# Patient Record
Sex: FEMALE | Race: WHITE
[De-identification: names, ages, dates, MRNs, and addresses within clinical notes are randomized per-mention and may not be internally consistent; named-entity substitution may affect disease eponyms.]

---

## 2017-03-22 ENCOUNTER — RECORD ABSTRACTING (OUTPATIENT)
Age: 47
End: 2017-03-22

## 2017-03-22 DIAGNOSIS — Z78.9 OTHER SPECIFIED HEALTH STATUS: ICD-10-CM

## 2017-03-22 DIAGNOSIS — Z86.11 PERSONAL HISTORY OF TUBERCULOSIS: ICD-10-CM

## 2017-03-22 DIAGNOSIS — Z12.4 ENCOUNTER FOR SCREENING FOR MALIGNANT NEOPLASM OF CERVIX: ICD-10-CM

## 2017-03-22 PROBLEM — Z00.00 ENCOUNTER FOR PREVENTIVE HEALTH EXAMINATION: Status: ACTIVE | Noted: 2017-03-22

## 2017-06-08 ENCOUNTER — APPOINTMENT (OUTPATIENT)
Dept: OBGYN | Facility: CLINIC | Age: 47
End: 2017-06-08

## 2017-06-08 VITALS
DIASTOLIC BLOOD PRESSURE: 62 MMHG | SYSTOLIC BLOOD PRESSURE: 100 MMHG | WEIGHT: 119 LBS | BODY MASS INDEX: 20.07 KG/M2 | HEIGHT: 64.5 IN

## 2017-06-08 DIAGNOSIS — Z01.419 ENCOUNTER FOR GYNECOLOGICAL EXAMINATION (GENERAL) (ROUTINE) W/OUT ABNORMAL FINDINGS: ICD-10-CM

## 2017-06-13 ENCOUNTER — RESULT REVIEW (OUTPATIENT)
Age: 47
End: 2017-06-13

## 2017-06-15 ENCOUNTER — OUTPATIENT (OUTPATIENT)
Dept: OUTPATIENT SERVICES | Facility: HOSPITAL | Age: 47
LOS: 1 days | Discharge: HOME | End: 2017-06-15

## 2017-06-19 LAB — HPV E6+E7 MRNA CVX QL NAA+PROBE: NOT DETECTED

## 2017-06-28 DIAGNOSIS — R92.8 OTHER ABNORMAL AND INCONCLUSIVE FINDINGS ON DIAGNOSTIC IMAGING OF BREAST: ICD-10-CM

## 2018-06-24 ENCOUNTER — FORM ENCOUNTER (OUTPATIENT)
Age: 48
End: 2018-06-24

## 2018-06-25 ENCOUNTER — OUTPATIENT (OUTPATIENT)
Dept: OUTPATIENT SERVICES | Facility: HOSPITAL | Age: 48
LOS: 1 days | Discharge: HOME | End: 2018-06-25

## 2018-06-25 DIAGNOSIS — Z12.31 ENCOUNTER FOR SCREENING MAMMOGRAM FOR MALIGNANT NEOPLASM OF BREAST: ICD-10-CM

## 2023-07-27 ENCOUNTER — EMERGENCY (EMERGENCY)
Facility: HOSPITAL | Age: 53
LOS: 0 days | Discharge: HOME | End: 2023-07-27
Attending: EMERGENCY MEDICINE
Payer: MEDICAID

## 2023-07-27 VITALS
OXYGEN SATURATION: 100 % | HEART RATE: 99 BPM | SYSTOLIC BLOOD PRESSURE: 173 MMHG | WEIGHT: 119.93 LBS | TEMPERATURE: 98 F | DIASTOLIC BLOOD PRESSURE: 88 MMHG | RESPIRATION RATE: 18 BRPM | HEIGHT: 60 IN

## 2023-07-27 DIAGNOSIS — Z86.018 PERSONAL HISTORY OF OTHER BENIGN NEOPLASM: ICD-10-CM

## 2023-07-27 DIAGNOSIS — N63.20 UNSPECIFIED LUMP IN THE LEFT BREAST, UNSPECIFIED QUADRANT: ICD-10-CM

## 2023-07-27 PROCEDURE — 99282 EMERGENCY DEPT VISIT SF MDM: CPT

## 2023-07-27 PROCEDURE — 99283 EMERGENCY DEPT VISIT LOW MDM: CPT

## 2023-07-27 NOTE — ED ADULT NURSE NOTE - NSFALLUNIVINTERV_ED_ALL_ED
Bed/Stretcher in lowest position, wheels locked, appropriate side rails in place/Call bell, personal items and telephone in reach/Instruct patient to call for assistance before getting out of bed/chair/stretcher/Non-slip footwear applied when patient is off stretcher/[place] to call system/Physically safe environment - no spills, clutter or unnecessary equipment/Purposeful proactive rounding/Room/bathroom lighting operational, light cord in reach

## 2023-07-27 NOTE — ED PROVIDER NOTE - ATTENDING CONTRIBUTION TO CARE
53 YF, PW left breast lump, nontender, nonmobile, left upper quadrant location.  No nipple discharge or blood, no noted skin change on exam, no weight change or night sweats.  No other chest pain.  -Likely breast mass associated with changes in hormones given recent delivery.    -Also possible is mastitis, though less likely without current breast-feeding and 9 months ago delivery.  -Malignancy also possible, unable to further evaluate at this time, with no secondary supportive signs.  Will require further breast imaging with breast specialty.

## 2023-07-27 NOTE — ED ADULT TRIAGE NOTE - CHIEF COMPLAINT QUOTE
pt c/o lump found in left breast 2 days ago. lump noticed to be a little bigger, similar situation 15 years ago on right side. denies pain

## 2023-07-27 NOTE — ED PROVIDER NOTE - PATIENT PORTAL LINK FT
You can access the FollowMyHealth Patient Portal offered by [facility] by registering at the following website: [email or web address]. By joining [employer]’s FollowMyHealth portal, you will also be able to view your health information using other applications (apps) compatible with our system.

## 2023-07-27 NOTE — ED PROVIDER NOTE - CLINICAL SUMMARY MEDICAL DECISION MAKING FREE TEXT BOX
53 YF, PW left breast lump, nontender, nonmobile, left upper quadrant location.  No nipple discharge or blood, no noted skin change on exam, no weight change or night sweats.  Recommended close follow-up with OB/GYN/breast specialist.

## 2023-07-27 NOTE — ED PROVIDER NOTE - NSFOLLOWUPCLINICS_GEN_ALL_ED_FT
[facility]  Breast  [address]  [place] [address]  Phone: [phone]  Fax:

## 2023-07-27 NOTE — ED PROVIDER NOTE - OBJECTIVE STATEMENT
53-year-old female G1, P1 delivered 9 months ago not breast-feeding history of fibromyalgia of the right breast resected and biopsied benign follows with Dr. Ribeiro complains of left breast mass that she noticed yesterday.  Patient describes mass is nontender nonmobile and in the left upper quadrant of the left breast.  No constitutional symptoms, no unintentional weight loss no other complaints.

## 2023-07-27 NOTE — ED PROVIDER NOTE - PHYSICAL EXAMINATION
CONSTITUTIONAL: nontoxic appearing, in no acute distress  HEAD:  normocephalic, atraumatic  EYES:  no conjunctival injection, no eye discharge, tracking well  ENT: mmm  NECK:  supple, no masses, no tender anterior/posterior cervical lymphadenopathy  CV:  regular rate and rhythm, cap refill < 2 seconds  CHEST: L breast w/ multiple nontender nonmobile lumps at the 2oclock position; multiple lumps at the 11oclock position of the R breast, nonmobile, nontender; no erythema or edema, or excoriations   RESP:  normal respiratory effort,  ABD:  soft, nontender, nondistended, no masses, no organomegaly  LYMPH:  no significant lymphadenopathy  MSK/NEURO:  normal movement, normal tone  SKIN:  warm, dry, no rash

## 2023-07-27 NOTE — ED ADULT NURSE NOTE - OBJECTIVE STATEMENT
Use over-the-counter Xyzal 5 mg by mouth once daily and Flonase 1 spray in each nostril daily as needed for relief of allergic symptoms such as runny nose, nasal congestion, and cough.        Ciprofloxacin tablets  What is this medicine?  CIPROFLOXACIN (sip kwesi FLOX a sin) is a quinolone antibiotic. It is used to treat certain kinds of bacterial infections. It will not work for colds, flu, or other viral infections.  How should I use this medicine?  Take this medicine by mouth with a glass of water. Follow the directions on the prescription label. Take your medicine at regular intervals. Do not take your medicine more often than directed. Take all of your medicine as directed even if you think your are better. Do not skip doses or stop your medicine early.  You can take this medicine with food or on an empty stomach. It can be taken with a meal that contains dairy or calcium, but do not take it alone with a dairy product, like milk or yogurt or calcium-fortified juice.  A special MedGuide will be given to you by the pharmacist with each prescription and refill. Be sure to read this information carefully each time.  Talk to your pediatrician regarding the use of this medicine in children. Special care may be needed.  What side effects may I notice from receiving this medicine?  Side effects that you should report to your doctor or health care professional as soon as possible:  · allergic reactions like skin rash or hives, swelling of the face, lips, or tongue  · anxious  · confusion  · depressed mood  · diarrhea  · fast, irregular heartbeat  · hallucination, loss of contact with reality  · joint, muscle, or tendon pain or swelling  · pain, tingling, numbness in the hands or feet  · suicidal thoughts or other mood changes  · sunburn  · unusually weak or tired  Side effects that usually do not require medical attention (report to your doctor or health care professional if they continue or are bothersome):  · dry  mouth  · headache  · nausea  · trouble sleeping  What may interact with this medicine?  Do not take this medicine with any of the following medications:  · cisapride  · droperidol  · terfenadine  · tizanidine  This medicine may also interact with the following medications:  · antacids  · birth control pills  · caffeine  · cyclosporin  · didanosine (ddI) buffered tablets or powder  · medicines for diabetes  · medicines for inflammation like ibuprofen, naproxen  · methotrexate  · multivitamins  · omeprazole  · phenytoin  · probenecid  · sucralfate  · theophylline  · warfarin  What if I miss a dose?  If you miss a dose, take it as soon as you can. If it is almost time for your next dose, take only that dose. Do not take double or extra doses.  Where should I keep my medicine?  Keep out of the reach of children.  Store at room temperature below 30 degrees C (86 degrees F). Keep container tightly closed. Throw away any unused medicine after the expiration date.  What should I tell my health care provider before I take this medicine?  They need to know if you have any of these conditions:  · bone problems  · cerebral disease  · history of low levels of potassium in the blood  · joint problems  · irregular heartbeat  · kidney disease  · myasthenia gravis  · seizures  · tendon problems  · tingling of the fingers or toes, or other nerve disorder  · an unusual or allergic reaction to ciprofloxacin, other antibiotics or medicines, foods, dyes, or preservatives  · pregnant or trying to get pregnant  · breast-feeding  What should I watch for while using this medicine?  Tell your doctor or health care professional if your symptoms do not improve.  Do not treat diarrhea with over the counter products. Contact your doctor if you have diarrhea that lasts more than 2 days or if it is severe and watery.  You may get drowsy or dizzy. Do not drive, use machinery, or do anything that needs mental alertness until you know how this  medicine affects you. Do not stand or sit up quickly, especially if you are an older patient. This reduces the risk of dizzy or fainting spells.  This medicine can make you more sensitive to the sun. Keep out of the sun. If you cannot avoid being in the sun, wear protective clothing and use sunscreen. Do not use sun lamps or tanning beds/booths.  Avoid antacids, aluminum, calcium, iron, magnesium, and zinc products for 6 hours before and 2 hours after taking a dose of this medicine.  NOTE:This sheet is a summary. It may not cover all possible information. If you have questions about this medicine, talk to your doctor, pharmacist, or health care provider. Copyright© 2017 [employer]         pt c/o lump found in left breast 2 days ago. lump noticed to be a little bigger, similar situation 15 years ago on right side. denies pain. no signs of distress noted.

## 2023-07-28 NOTE — CHART NOTE - NSCHARTNOTEFT_GEN_A_CORE
breast surgery appt scheduled for 8/24 at 1 pm with Dr. Kimberli Cano [facility] pt aware of appt details.

## 2023-08-21 NOTE — HISTORY OF PRESENT ILLNESS
[FreeTextEntry1] : Patient is a 53F who recently presented to the ED with concern of a possible left breast palpable mass.  States she has a history of right breast excision at 36 that was benign.  FHx:  He rmost recent imaging is as follows: 6/25/2018: B scg mmg --> BIRADS 2 The breasts are extremely dense, which lowers the sensitivity of mammography.  There is an area of benign architectural distortion corresponding to the site of surgery and consistent with post operative fibrosis seen in the right breast. No suspicious masses, calcifications or other abnormalities are seen. When compared to prior studies there is no significant change.

## 2023-08-22 DIAGNOSIS — N63.0 UNSPECIFIED LUMP IN UNSPECIFIED BREAST: ICD-10-CM

## 2023-08-29 ENCOUNTER — RESULT REVIEW (OUTPATIENT)
Age: 53
End: 2023-08-29

## 2023-08-29 ENCOUNTER — OUTPATIENT (OUTPATIENT)
Dept: OUTPATIENT SERVICES | Facility: HOSPITAL | Age: 53
LOS: 1 days | End: 2023-08-29
Payer: COMMERCIAL

## 2023-08-29 DIAGNOSIS — R92.2 INCONCLUSIVE MAMMOGRAM: ICD-10-CM

## 2023-08-29 DIAGNOSIS — N63.20 UNSPECIFIED LUMP IN THE LEFT BREAST, UNSPECIFIED QUADRANT: ICD-10-CM

## 2023-08-29 DIAGNOSIS — Z12.31 ENCOUNTER FOR SCREENING MAMMOGRAM FOR MALIGNANT NEOPLASM OF BREAST: ICD-10-CM

## 2023-08-29 PROCEDURE — G0279: CPT

## 2023-08-29 PROCEDURE — 76641 ULTRASOUND BREAST COMPLETE: CPT | Mod: 26,50

## 2023-08-29 PROCEDURE — 76641 ULTRASOUND BREAST COMPLETE: CPT | Mod: 50

## 2023-08-29 PROCEDURE — 77066 DX MAMMO INCL CAD BI: CPT

## 2023-08-29 PROCEDURE — G0279: CPT | Mod: 26

## 2023-08-29 PROCEDURE — 77066 DX MAMMO INCL CAD BI: CPT | Mod: 26

## 2023-08-30 ENCOUNTER — NON-APPOINTMENT (OUTPATIENT)
Age: 53
End: 2023-08-30

## 2023-08-30 DIAGNOSIS — N63.20 UNSPECIFIED LUMP IN THE LEFT BREAST, UNSPECIFIED QUADRANT: ICD-10-CM

## 2023-09-05 ENCOUNTER — APPOINTMENT (OUTPATIENT)
Age: 53
End: 2023-09-05
Payer: MEDICAID

## 2023-09-05 ENCOUNTER — RESULT REVIEW (OUTPATIENT)
Age: 53
End: 2023-09-05

## 2023-09-05 ENCOUNTER — OUTPATIENT (OUTPATIENT)
Dept: OUTPATIENT SERVICES | Facility: HOSPITAL | Age: 53
LOS: 1 days | End: 2023-09-05
Payer: MEDICAID

## 2023-09-05 DIAGNOSIS — R92.8 OTHER ABNORMAL AND INCONCLUSIVE FINDINGS ON DIAGNOSTIC IMAGING OF BREAST: ICD-10-CM

## 2023-09-05 PROCEDURE — A4648: CPT

## 2023-09-05 PROCEDURE — 88305 TISSUE EXAM BY PATHOLOGIST: CPT | Mod: 26

## 2023-09-05 PROCEDURE — 19083 BX BREAST 1ST LESION US IMAG: CPT | Mod: LT

## 2023-09-05 PROCEDURE — 77065 DX MAMMO INCL CAD UNI: CPT | Mod: 26,LT

## 2023-09-05 PROCEDURE — 77065 DX MAMMO INCL CAD UNI: CPT | Mod: LT

## 2023-09-05 PROCEDURE — 88305 TISSUE EXAM BY PATHOLOGIST: CPT

## 2023-09-06 LAB — SURGICAL PATHOLOGY STUDY: SIGNIFICANT CHANGE UP

## 2023-09-07 DIAGNOSIS — N64.89 OTHER SPECIFIED DISORDERS OF BREAST: ICD-10-CM

## 2023-09-07 DIAGNOSIS — D24.2 BENIGN NEOPLASM OF LEFT BREAST: ICD-10-CM

## 2023-09-07 DIAGNOSIS — N63.20 UNSPECIFIED LUMP IN THE LEFT BREAST, UNSPECIFIED QUADRANT: ICD-10-CM

## 2023-09-07 NOTE — HISTORY OF PRESENT ILLNESS
[FreeTextEntry1] : Patient is a 53F with new dx of left breast FA and BIRADS3 abnormality on left US    States she has a history of right breast excision at 36 that was benign.  FHx:  He rmost recent imaging is as follows: 6/25/2018: B scg mmg --> BIRADS 2 The breasts are extremely dense, which lowers the sensitivity of mammography.  There is an area of benign architectural distortion corresponding to the site of surgery and consistent with post operative fibrosis seen in the right breast. No suspicious masses, calcifications or other abnormalities are seen. When compared to prior studies there is no significant change.  08/29/2023 b/l dx mammo and US-->BIRADS4 -breasts are extremely dense -An oval partially obscured mass is noted in the left upper outer quadrant corresponds to the region of palpable concern.  -Postoperative changes are noted in the right lower inner quadrant.  LEFT US: - in the region of palpable concern, @1-2 N 4 to 5 a hypoechoic solid vascular mass measuring 2.3 x 1.2 x 2.1 cm. This is suspicious.   RIGHT US: -@1:00 4 cm from the nipple, there is an ovoid well-circumscribed hypoechoic mass measuring 0.8 x 0.7 x 0.4 cm--> This is probably benign.   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

## 2023-09-07 NOTE — DATA REVIEWED
[FreeTextEntry1] : CC: [id number]     EXAM:  MG US BREAST COMPLETE BI   ORDERED BY: DILLON REEVES  ACC: [id number]     EXAM:  MG MAMMO DIAG W CHINYERE BI#   ORDERED BY: DILLON REEVES 08/29/2023 INTERPRETATION:  Patient presents with a palpable lump in the left breast, upper outer quadrant. Patient has a history of fibroadenoma resection in the right breast in 2008  The patient reports her last clinical breast examination was performed within the past year  Spot magnification imaging of the symptomatic area was performed in addition to routine mammographic projections.  3D tomosynthesis images were obtained as well.  Computer-aided detection was utilized in the interpretation of this examination.  Comparison is made to several prior examinations dating back to 2013.  Breast composition:The breasts are extremely dense, which lowers the sensitivity of mammography.  An oval partially obscured mass is noted in the left upper outer quadrant corresponds to the region of palpable concern. Postoperative changes are noted in the right lower inner quadrant. Otherwise, no suspicious mass or architectural distortion is identified bilaterally.  Bilateral whole Breast Sonogram:  In the left breast, in the region of palpable concern, at 1-2 o'clock 4 to 5 cm from the nipple, there is a hypoechoic solid vascular mass measuring 2.3 x 1.2 x 2.1 cm. This is suspicious. In the right breast at 1:00 4 cm from the nipple, there is an ovoid well-circumscribed hypoechoic mass measuring 0.8 x 0.7 x 0.4 cm. This is probably benign. No other masses or cysts are identified. No evidence of axillary lymphadenopathy. IMPRESSION: 2.3 cm mass in the left breast is suspicious. Ultrasound-guided biopsy is recommended  0.8 cm mass in the right breast is probably benign and follow-up ultrasound is recommended.  Recommendation: Ultrasound-guided biopsy of the left breast.  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  Benign concordant histology.  Recommendation: Surgical management. Please note that the patient has a probably benign lesion in the contralateral breast and follow-up ultrasound in 6 months is recommended.  The results were discussed with the patient by telephone on 9/6/23 at 1230 pm  --- End of Report ---  *** END OF ADDENDUM # 1 ***     09/05/2023 INTERPRETATION:  CLINICAL HISTORY: Left breast mass  PROCEDURES: Left breast ultrasound guided spring loaded core biopsy and post clip placement two view left mammogram.  TECHNIQUE: Previous films and reports were reviewed. The procedure, its risks, benefits, and alternatives were explained to the patient, who expressed understanding and gave informed written and verbal consent. A time-out, which included the patient's full name, date of birth, description of the expected procedure and procedure site, was performed immediately before the procedure.  Using the usual sterile technique and ultrasound guidance, the previously described 2.3 cm mass in the left breast at 1-2 o'clock 4 to 5 cm from the nipple was biopsied utilizing a 14-gauge automated Bard core biopsy device with a 13-gauge introducer sheath. 5 samples were collected.  A marking clip ([employer] top-hat) was deployed under ultrasound guidance. Hemostasis was obtained and a sterile dressing was applied.  A left mammogram was performed: VIEWS: CC and ML views of the left breast. BREAST COMPOSITION: The breasts are extremely dense, which lowers the sensitivity of mammography. FINDINGS: The biopsy clip placed during the ultrasound guided biopsy is in the anticipated location in the left breast. FINAL ASSESSMENT Category: Post Procedure Mammogram for Marker Placement  The patient tolerated the procedure well and left the department in good condition with written discharge instructions.   IMPRESSION:  Successful ultrasound guided core biopsy of the left breast.  Pathology: Pending, to be reported as an addendum.

## 2023-09-11 ENCOUNTER — APPOINTMENT (OUTPATIENT)
Dept: BREAST CENTER | Facility: CLINIC | Age: 53
End: 2023-09-11
Payer: MEDICAID

## 2023-09-11 VITALS
DIASTOLIC BLOOD PRESSURE: 61 MMHG | HEIGHT: 64 IN | BODY MASS INDEX: 20.49 KG/M2 | SYSTOLIC BLOOD PRESSURE: 103 MMHG | WEIGHT: 120 LBS

## 2023-09-11 PROCEDURE — 99204 OFFICE O/P NEW MOD 45 MIN: CPT

## 2023-09-14 ENCOUNTER — APPOINTMENT (OUTPATIENT)
Dept: BREAST CENTER | Facility: CLINIC | Age: 53
End: 2023-09-14

## 2023-09-18 ENCOUNTER — APPOINTMENT (OUTPATIENT)
Dept: OBGYN | Facility: CLINIC | Age: 53
End: 2023-09-18

## 2024-01-22 ENCOUNTER — APPOINTMENT (OUTPATIENT)
Dept: OBGYN | Facility: CLINIC | Age: 54
End: 2024-01-22

## 2024-01-24 ENCOUNTER — NON-APPOINTMENT (OUTPATIENT)
Age: 54
End: 2024-01-24

## 2024-01-30 ENCOUNTER — OUTPATIENT (OUTPATIENT)
Dept: OUTPATIENT SERVICES | Facility: HOSPITAL | Age: 54
LOS: 1 days | End: 2024-01-30
Payer: MEDICAID

## 2024-01-30 VITALS
OXYGEN SATURATION: 100 % | DIASTOLIC BLOOD PRESSURE: 64 MMHG | TEMPERATURE: 98 F | HEIGHT: 64 IN | RESPIRATION RATE: 18 BRPM | HEART RATE: 78 BPM | SYSTOLIC BLOOD PRESSURE: 100 MMHG | WEIGHT: 115.08 LBS

## 2024-01-30 DIAGNOSIS — Z01.818 ENCOUNTER FOR OTHER PREPROCEDURAL EXAMINATION: ICD-10-CM

## 2024-01-30 DIAGNOSIS — Z98.890 OTHER SPECIFIED POSTPROCEDURAL STATES: Chronic | ICD-10-CM

## 2024-01-30 DIAGNOSIS — D24.2 BENIGN NEOPLASM OF LEFT BREAST: ICD-10-CM

## 2024-01-30 LAB
ALBUMIN SERPL ELPH-MCNC: 4.7 G/DL — SIGNIFICANT CHANGE UP (ref 3.5–5.2)
ALP SERPL-CCNC: 58 U/L — SIGNIFICANT CHANGE UP (ref 30–115)
ALT FLD-CCNC: 16 U/L — SIGNIFICANT CHANGE UP (ref 0–41)
ANION GAP SERPL CALC-SCNC: 13 MMOL/L — SIGNIFICANT CHANGE UP (ref 7–14)
AST SERPL-CCNC: 21 U/L — SIGNIFICANT CHANGE UP (ref 0–41)
BASOPHILS # BLD AUTO: 0.03 K/UL — SIGNIFICANT CHANGE UP (ref 0–0.2)
BASOPHILS NFR BLD AUTO: 0.5 % — SIGNIFICANT CHANGE UP (ref 0–1)
BILIRUB SERPL-MCNC: 0.2 MG/DL — SIGNIFICANT CHANGE UP (ref 0.2–1.2)
BUN SERPL-MCNC: 17 MG/DL — SIGNIFICANT CHANGE UP (ref 10–20)
CALCIUM SERPL-MCNC: 9.8 MG/DL — SIGNIFICANT CHANGE UP (ref 8.4–10.5)
CHLORIDE SERPL-SCNC: 103 MMOL/L — SIGNIFICANT CHANGE UP (ref 98–110)
CO2 SERPL-SCNC: 27 MMOL/L — SIGNIFICANT CHANGE UP (ref 17–32)
CREAT SERPL-MCNC: 0.6 MG/DL — LOW (ref 0.7–1.5)
EGFR: 107 ML/MIN/1.73M2 — SIGNIFICANT CHANGE UP
EOSINOPHIL # BLD AUTO: 0.31 K/UL — SIGNIFICANT CHANGE UP (ref 0–0.7)
EOSINOPHIL NFR BLD AUTO: 4.9 % — SIGNIFICANT CHANGE UP (ref 0–8)
GLUCOSE SERPL-MCNC: 81 MG/DL — SIGNIFICANT CHANGE UP (ref 70–99)
HCT VFR BLD CALC: 41.7 % — SIGNIFICANT CHANGE UP (ref 37–47)
HGB BLD-MCNC: 13.4 G/DL — SIGNIFICANT CHANGE UP (ref 12–16)
IMM GRANULOCYTES NFR BLD AUTO: 0.3 % — SIGNIFICANT CHANGE UP (ref 0.1–0.3)
LYMPHOCYTES # BLD AUTO: 1.92 K/UL — SIGNIFICANT CHANGE UP (ref 1.2–3.4)
LYMPHOCYTES # BLD AUTO: 30.5 % — SIGNIFICANT CHANGE UP (ref 20.5–51.1)
MCHC RBC-ENTMCNC: 30.5 PG — SIGNIFICANT CHANGE UP (ref 27–31)
MCHC RBC-ENTMCNC: 32.1 G/DL — SIGNIFICANT CHANGE UP (ref 32–37)
MCV RBC AUTO: 95 FL — SIGNIFICANT CHANGE UP (ref 81–99)
MONOCYTES # BLD AUTO: 0.49 K/UL — SIGNIFICANT CHANGE UP (ref 0.1–0.6)
MONOCYTES NFR BLD AUTO: 7.8 % — SIGNIFICANT CHANGE UP (ref 1.7–9.3)
NEUTROPHILS # BLD AUTO: 3.52 K/UL — SIGNIFICANT CHANGE UP (ref 1.4–6.5)
NEUTROPHILS NFR BLD AUTO: 56 % — SIGNIFICANT CHANGE UP (ref 42.2–75.2)
NRBC # BLD: 0 /100 WBCS — SIGNIFICANT CHANGE UP (ref 0–0)
PLATELET # BLD AUTO: 297 K/UL — SIGNIFICANT CHANGE UP (ref 130–400)
PMV BLD: 9.8 FL — SIGNIFICANT CHANGE UP (ref 7.4–10.4)
POTASSIUM SERPL-MCNC: 4.8 MMOL/L — SIGNIFICANT CHANGE UP (ref 3.5–5)
POTASSIUM SERPL-SCNC: 4.8 MMOL/L — SIGNIFICANT CHANGE UP (ref 3.5–5)
PROT SERPL-MCNC: 7.1 G/DL — SIGNIFICANT CHANGE UP (ref 6–8)
RBC # BLD: 4.39 M/UL — SIGNIFICANT CHANGE UP (ref 4.2–5.4)
RBC # FLD: 12.9 % — SIGNIFICANT CHANGE UP (ref 11.5–14.5)
SODIUM SERPL-SCNC: 143 MMOL/L — SIGNIFICANT CHANGE UP (ref 135–146)
WBC # BLD: 6.29 K/UL — SIGNIFICANT CHANGE UP (ref 4.8–10.8)
WBC # FLD AUTO: 6.29 K/UL — SIGNIFICANT CHANGE UP (ref 4.8–10.8)

## 2024-01-30 PROCEDURE — 36415 COLL VENOUS BLD VENIPUNCTURE: CPT

## 2024-01-30 PROCEDURE — 99214 OFFICE O/P EST MOD 30 MIN: CPT | Mod: 25

## 2024-01-30 PROCEDURE — 85025 COMPLETE CBC W/AUTO DIFF WBC: CPT

## 2024-01-30 PROCEDURE — 80053 COMPREHEN METABOLIC PANEL: CPT

## 2024-01-30 NOTE — H&P PST ADULT - HISTORY OF PRESENT ILLNESS
Patient is a 77 year old female presenting to PAST in preparation for Left Breast lumpectomy with Needle Localization  on  2/7  under lsb anesthesia by Dr. Ag  PATIENT CURRENTLY DENIES CHEST PAIN  SHORTNESS OF BREATH  PALPITATIONS,  DYSURIA, OR UPPER RESPIRATORY INFECTION IN PAST 2 WEEKS    Anesthesia Alert  NO--Difficult Airway  NO--History of neck surgery or radiation  NO--Limited ROM of neck  NO--History of Malignant hyperthermia  NO--Personal or family history of Pseudocholinesterase deficiency  NO--Prior Anesthesia Complication  NO--Latex Allergy  NO--Loose teeth  NO--History of Rheumatoid Arthritis  NO--LAVINIA  NO-- BLEEDING RISK  NO--Other_____    As per patient, this is their complete medical and surgical history, including medications both prescribed or over the counter.  Patient verbalized understanding of instructions and was given the opportunity to ask questions and have them answered.   Patient is a 77 year old female presenting to PAST in preparation for Left Breast lumpectomy with Needle Localization  on  2/7  under lsb anesthesia by Dr. Ag  Reports h/o abnormal mammo had a bx also and was advised to have above  PATIENT CURRENTLY DENIES CHEST PAIN  SHORTNESS OF BREATH  PALPITATIONS,  DYSURIA, OR UPPER RESPIRATORY INFECTION IN PAST 2 WEEKS    Anesthesia Alert  NO--Difficult Airway  NO--History of neck surgery or radiation  NO--Limited ROM of neck  NO--History of Malignant hyperthermia  NO--Personal or family history of Pseudocholinesterase deficiency  NO--Prior Anesthesia Complication  NO--Latex Allergy  NO--Loose teeth  NO--History of Rheumatoid Arthritis  NO--LAVINIA  NO-- BLEEDING RISK  NO--Other_____    Duke Activity Status Index (DASI) from [employer]  on 1/30/2024      RESULT SUMMARY:  58.2 points  The higher the score (maximum 58.2), the higher the functional status.    9.89 METs        INPUTS:  Take care of self —> 2.75 = Yes  Walk indoors —> 1.75 = Yes  Walk 1&ndash;2 blocks on level ground —> 2.75 = Yes  Climb a flight of stairs or walk up a hill —> 5.5 = Yes  Run a short distance —> 8 = Yes  Do light work around the house —> 2.7 = Yes  Do moderate work around the house —> 3.5 = Yes  Do heavy work around the house —> 8 = Yes  Do yardwork —> 4.5 = Yes  Have sexual relations —> 5.25 = Yes  Participate in moderate recreational activities —> 6 = Yes  Participate in strenuous sports —> 7.5 = Yes      Revised Cardiac Risk Index for Pre-Operative Risk from [employer]  on 1/30/2024      RESULT SUMMARY:  0 points  Class I Risk    3.9 %  30-day risk of death, MI, or cardiac arrest    From [employer] 2017. These numbers are higher than those from the original study (Van 1999). See Evidence for details.      INPUTS:  Elevated-risk surgery —> 0 = No  History of ischemic heart disease —> 0 = No  History of congestive heart failure —> 0 = No  History of cerebrovascular disease —> 0 = No  Pre-operative treatment with insulin —> 0 = No  Pre-operative creatinine >2 mg/dL / 176.8 µmol/L —> 0 = No        As per patient, this is their complete medical and surgical history, including medications both prescribed or over the counter.  Patient verbalized understanding of instructions and was given the opportunity to ask questions and have them answered.   Patient is a 53 year old female presenting to PAST in preparation for Left Breast lumpectomy with Needle Localization  on  2/7  under lsb anesthesia by Dr. Ag  Reports h/o abnormal mammo had a bx also and was advised to have above  PATIENT CURRENTLY DENIES CHEST PAIN  SHORTNESS OF BREATH  PALPITATIONS,  DYSURIA, OR UPPER RESPIRATORY INFECTION IN PAST 2 WEEKS    Anesthesia Alert  NO--Difficult Airway  NO--History of neck surgery or radiation  NO--Limited ROM of neck  NO--History of Malignant hyperthermia  NO--Personal or family history of Pseudocholinesterase deficiency  NO--Prior Anesthesia Complication  NO--Latex Allergy  NO--Loose teeth  NO--History of Rheumatoid Arthritis  NO--LAVINIA  NO-- BLEEDING RISK  NO--Other_____    Duke Activity Status Index (DASI) from [employer]  on 1/30/2024      RESULT SUMMARY:  58.2 points  The higher the score (maximum 58.2), the higher the functional status.    9.89 METs        INPUTS:  Take care of self —> 2.75 = Yes  Walk indoors —> 1.75 = Yes  Walk 1&ndash;2 blocks on level ground —> 2.75 = Yes  Climb a flight of stairs or walk up a hill —> 5.5 = Yes  Run a short distance —> 8 = Yes  Do light work around the house —> 2.7 = Yes  Do moderate work around the house —> 3.5 = Yes  Do heavy work around the house —> 8 = Yes  Do yardwork —> 4.5 = Yes  Have sexual relations —> 5.25 = Yes  Participate in moderate recreational activities —> 6 = Yes  Participate in strenuous sports —> 7.5 = Yes      Revised Cardiac Risk Index for Pre-Operative Risk from [employer]  on 1/30/2024      RESULT SUMMARY:  0 points  Class I Risk    3.9 %  30-day risk of death, MI, or cardiac arrest    From [employer]ppe 2017. These numbers are higher than those from the original study (Van 1999). See Evidence for details.      INPUTS:  Elevated-risk surgery —> 0 = No  History of ischemic heart disease —> 0 = No  History of congestive heart failure —> 0 = No  History of cerebrovascular disease —> 0 = No  Pre-operative treatment with insulin —> 0 = No  Pre-operative creatinine >2 mg/dL / 176.8 µmol/L —> 0 = No        As per patient, this is their complete medical and surgical history, including medications both prescribed or over the counter.  Patient verbalized understanding of instructions and was given the opportunity to ask questions and have them answered.   Patient is a 53 year old female presenting to PAST in preparation for Left Breast lumpectomy, Tissue Transfer with Needle Localization  on  2/7  under lsb anesthesia by Dr. Ag  Reports h/o abnormal mammo had a bx also and was advised to have above  PATIENT CURRENTLY DENIES CHEST PAIN  SHORTNESS OF BREATH  PALPITATIONS,  DYSURIA, OR UPPER RESPIRATORY INFECTION IN PAST 2 WEEKS    Anesthesia Alert  NO--Difficult Airway  NO--History of neck surgery or radiation  NO--Limited ROM of neck  NO--History of Malignant hyperthermia  NO--Personal or family history of Pseudocholinesterase deficiency  NO--Prior Anesthesia Complication  NO--Latex Allergy  NO--Loose teeth  NO--History of Rheumatoid Arthritis  NO--LAVINIA  NO-- BLEEDING RISK  NO--Other_____    Duke Activity Status Index (DASI) from "[employer]  on 1/30/2024      RESULT SUMMARY:  58.2 points  The higher the score (maximum 58.2), the higher the functional status.    9.89 METs        INPUTS:  Take care of self —> 2.75 = Yes  Walk indoors —> 1.75 = Yes  Walk 1&ndash;2 blocks on level ground —> 2.75 = Yes  Climb a flight of stairs or walk up a hill —> 5.5 = Yes  Run a short distance —> 8 = Yes  Do light work around the house —> 2.7 = Yes  Do moderate work around the house —> 3.5 = Yes  Do heavy work around the house —> 8 = Yes  Do yardwork —> 4.5 = Yes  Have sexual relations —> 5.25 = Yes  Participate in moderate recreational activities —> 6 = Yes  Participate in strenuous sports —> 7.5 = Yes      Revised Cardiac Risk Index for Pre-Operative Risk from "[employer]  on 1/30/2024      RESULT SUMMARY:  0 points  Class I Risk    3.9 %  30-day risk of death, MI, or cardiac arrest    From Duceppe 2017. These numbers are higher than those from the original study (Van 1999). See Evidence for details.      INPUTS:  Elevated-risk surgery —> 0 = No  History of ischemic heart disease —> 0 = No  History of congestive heart failure —> 0 = No  History of cerebrovascular disease —> 0 = No  Pre-operative treatment with insulin —> 0 = No  Pre-operative creatinine >2 mg/dL / 176.8 µmol/L —> 0 = No        As per patient, this is their complete medical and surgical history, including medications both prescribed or over the counter.  Patient verbalized understanding of instructions and was given the opportunity to ask questions and have them answered.

## 2024-01-31 ENCOUNTER — OUTPATIENT (OUTPATIENT)
Dept: OUTPATIENT SERVICES | Facility: HOSPITAL | Age: 54
LOS: 1 days | End: 2024-01-31
Payer: MEDICAID

## 2024-01-31 ENCOUNTER — RESULT REVIEW (OUTPATIENT)
Age: 54
End: 2024-01-31

## 2024-01-31 DIAGNOSIS — N64.59 OTHER SIGNS AND SYMPTOMS IN BREAST: ICD-10-CM

## 2024-01-31 DIAGNOSIS — Z98.890 OTHER SPECIFIED POSTPROCEDURAL STATES: Chronic | ICD-10-CM

## 2024-01-31 DIAGNOSIS — Z01.818 ENCOUNTER FOR OTHER PREPROCEDURAL EXAMINATION: ICD-10-CM

## 2024-01-31 DIAGNOSIS — R92.8 OTHER ABNORMAL AND INCONCLUSIVE FINDINGS ON DIAGNOSTIC IMAGING OF BREAST: ICD-10-CM

## 2024-01-31 DIAGNOSIS — D24.2 BENIGN NEOPLASM OF LEFT BREAST: ICD-10-CM

## 2024-01-31 PROCEDURE — 76642 ULTRASOUND BREAST LIMITED: CPT | Mod: 26,RT

## 2024-01-31 PROCEDURE — 76642 ULTRASOUND BREAST LIMITED: CPT | Mod: RT

## 2024-02-01 DIAGNOSIS — R92.8 OTHER ABNORMAL AND INCONCLUSIVE FINDINGS ON DIAGNOSTIC IMAGING OF BREAST: ICD-10-CM

## 2024-02-06 NOTE — ASU PATIENT PROFILE, ADULT - FALL HARM RISK - UNIVERSAL INTERVENTIONS
Bed in lowest position, wheels locked, appropriate side rails in place/Call bell, personal items and telephone in reach/Instruct patient to call for assistance before getting out of bed or chair/Non-slip footwear when patient is out of bed/[place] to call system/Physically safe environment - no spills, clutter or unnecessary equipment/Purposeful Proactive Rounding/Room/bathroom lighting operational, light cord in reach

## 2024-02-07 ENCOUNTER — RESULT REVIEW (OUTPATIENT)
Age: 54
End: 2024-02-07

## 2024-02-07 ENCOUNTER — TRANSCRIPTION ENCOUNTER (OUTPATIENT)
Age: 54
End: 2024-02-07

## 2024-02-07 ENCOUNTER — OUTPATIENT (OUTPATIENT)
Dept: OUTPATIENT SERVICES | Facility: HOSPITAL | Age: 54
LOS: 1 days | Discharge: HOME | End: 2024-02-07
Payer: MEDICAID

## 2024-02-07 ENCOUNTER — APPOINTMENT (OUTPATIENT)
Dept: BREAST CENTER | Facility: AMBULATORY SURGERY CENTER | Age: 54
End: 2024-02-07
Payer: MEDICAID

## 2024-02-07 VITALS
RESPIRATION RATE: 18 BRPM | OXYGEN SATURATION: 99 % | SYSTOLIC BLOOD PRESSURE: 102 MMHG | DIASTOLIC BLOOD PRESSURE: 57 MMHG | HEART RATE: 75 BPM

## 2024-02-07 VITALS
RESPIRATION RATE: 17 BRPM | WEIGHT: 115.08 LBS | OXYGEN SATURATION: 99 % | SYSTOLIC BLOOD PRESSURE: 100 MMHG | DIASTOLIC BLOOD PRESSURE: 56 MMHG | TEMPERATURE: 98 F | HEIGHT: 64 IN | HEART RATE: 62 BPM

## 2024-02-07 DIAGNOSIS — D24.2 BENIGN NEOPLASM OF LEFT BREAST: ICD-10-CM

## 2024-02-07 DIAGNOSIS — N60.32 FIBROSCLEROSIS OF LEFT BREAST: ICD-10-CM

## 2024-02-07 DIAGNOSIS — Z98.890 OTHER SPECIFIED POSTPROCEDURAL STATES: Chronic | ICD-10-CM

## 2024-02-07 PROCEDURE — 19301 PARTIAL MASTECTOMY: CPT

## 2024-02-07 PROCEDURE — 14001 TIS TRNFR TRUNK 10.1-30SQCM: CPT

## 2024-02-07 PROCEDURE — C1889: CPT

## 2024-02-07 PROCEDURE — 88305 TISSUE EXAM BY PATHOLOGIST: CPT | Mod: 26

## 2024-02-07 PROCEDURE — 19285 PERQ DEV BREAST 1ST US IMAG: CPT | Mod: LT

## 2024-02-07 PROCEDURE — 88305 TISSUE EXAM BY PATHOLOGIST: CPT

## 2024-02-07 PROCEDURE — 77065 DX MAMMO INCL CAD UNI: CPT | Mod: 26,LT

## 2024-02-07 PROCEDURE — 77065 DX MAMMO INCL CAD UNI: CPT | Mod: LT

## 2024-02-07 RX ORDER — OXYCODONE AND ACETAMINOPHEN 5; 325 MG/1; MG/1
1 TABLET ORAL
Qty: 12 | Refills: 0
Start: 2024-02-07 | End: 2024-02-09

## 2024-02-07 RX ORDER — SODIUM CHLORIDE 9 MG/ML
1000 INJECTION, SOLUTION INTRAVENOUS
Refills: 0 | Status: DISCONTINUED | OUTPATIENT
Start: 2024-02-07 | End: 2024-02-07

## 2024-02-07 RX ORDER — ONDANSETRON 8 MG/1
4 TABLET, FILM COATED ORAL ONCE
Refills: 0 | Status: DISCONTINUED | OUTPATIENT
Start: 2024-02-07 | End: 2024-02-07

## 2024-02-07 RX ORDER — ACETAMINOPHEN 500 MG
1000 TABLET ORAL ONCE
Refills: 0 | Status: DISCONTINUED | OUTPATIENT
Start: 2024-02-07 | End: 2024-02-07

## 2024-02-07 RX ORDER — OXYCODONE HYDROCHLORIDE 5 MG/1
5 TABLET ORAL ONCE
Refills: 0 | Status: DISCONTINUED | OUTPATIENT
Start: 2024-02-07 | End: 2024-02-07

## 2024-02-07 RX ORDER — HYDROMORPHONE HYDROCHLORIDE 2 MG/ML
0.5 INJECTION INTRAMUSCULAR; INTRAVENOUS; SUBCUTANEOUS
Refills: 0 | Status: DISCONTINUED | OUTPATIENT
Start: 2024-02-07 | End: 2024-02-07

## 2024-02-07 RX ADMIN — HYDROMORPHONE HYDROCHLORIDE 0.5 MILLIGRAM(S): 2 INJECTION INTRAMUSCULAR; INTRAVENOUS; SUBCUTANEOUS at 14:40

## 2024-02-07 RX ADMIN — SODIUM CHLORIDE 100 MILLILITER(S): 9 INJECTION, SOLUTION INTRAVENOUS at 15:14

## 2024-02-07 NOTE — CHART NOTE - NSCHARTNOTEFT_GEN_A_CORE
PACU ANESTHESIA ADMISSION NOTE      Procedure: Lumpectomy      Post op diagnosis:  Fibroadenoma, left        ____  Intubated  TV:______       Rate: ______      FiO2: ______    __x__  Patent Airway    ____  Full return of protective reflexes    ____  Full recovery from anesthesia / back to baseline status    Vitals:  T(C): 36.6   HR: 62   BP: 100/56   RR: 17   SpO2: 99%     Mental Status:  ___x_ Awake   _____ Alert   _____ Drowsy   _____ Sedated    Nausea/Vomiting:  ____ Yes, See Post - Op Orders      _x___ No    Pain Scale (0-10):  _____    Treatment: ____ None    __x__ See Post - Op/PCA Orders    Post - Operative Fluids:   ____ Oral   __x__ See Post - Op Orders    Plan: Discharge:   ___x_Home       _____Floor     _____Critical Care    _____  Other:_________________    Comments:  report given to RN DC to pacu

## 2024-02-07 NOTE — ASU DISCHARGE PLAN (ADULT/PEDIATRIC) - CARE PROVIDER_API CALL
[facility]  [address] [place] [address], [address] [place] [address]  Phone: [phone]  Fax: [phone]  Follow Up Time:

## 2024-02-07 NOTE — ASU DISCHARGE PLAN (ADULT/PEDIATRIC) - ASU DC SPECIAL INSTRUCTIONSFT
Diet: You may resume your usual diet. Avoid alcohol and excessive salt intake for two weeks following surgery. This will help to minimize swelling.    Medication: Take Tylenol 650mg every 6 hours and Advil 600mg every 8hr. If pain is still not well controlled take Percocet for breakthrough pain. DO NOT DRIVE WHILE TAKING NARCOTIC MEDICATION.     Activity: Start walking as soon as possible to increase circulation and prevent blood clots. You may take care of your personal needs as desired, however, no lifting (>10-15 lbs) or strenuous activity is allowed for 4 weeks following surgery.     Wound care: Keep your dressing clean, dry, and intact. Wear the surgical bra / sports bra which will be provided to you at all times. Do not smoke! It delays wound healing and increases the risk of complications.    Personal Hygiene: You are allowed to shower. Do not scrub the operative area with soap and water. allow water to wash over the area and pat dry. Please place surgical bra / sports bra back on afterwards.    Sun Exposure: You may be in the sun one month following surgery. Avoid sunburn. Always use a sunscreen of at least SPF30.    Things to expect: The operative area may be bruised, swollen, and painful. You may also have temporary numbness which will improve over time.    In case of emergency: call the office any time day or night. Post-operative care will be provided in the office one week following surgery. If you do not already have an appointment, please call during regular office hours to schedule.    We wish you a pleasant recovery.

## 2024-02-07 NOTE — BRIEF OPERATIVE NOTE - OPERATION/FINDINGS
Quality 47: Advance Care Plan: Advance Care Planning discussed and documented; advance care plan or surrogate decision maker documented in the medical record.
Quality 226: Preventive Care And Screening: Tobacco Use: Screening And Cessation Intervention: Patient screened for tobacco use and is an ex/non-smoker
Quality 137: Melanoma: Continuity Of Care - Recall System: Patient information entered into a recall system that includes: target date for the next exam specified AND a process to follow up with patients regarding missed or unscheduled appointments
Detail Level: Detailed
Excision of approximately 3qkh7gi mass inclusive of fibroadenoma with needle localization. Hemostasis achieved
Quality 130: Documentation Of Current Medications In The Medical Record: Current Medications Documented
Quality 431: Preventive Care And Screening: Unhealthy Alcohol Use - Screening: Patient screened for unhealthy alcohol use using a single question and scores less than 2 times per year

## 2024-02-09 LAB — SURGICAL PATHOLOGY STUDY: SIGNIFICANT CHANGE UP

## 2024-02-10 PROBLEM — N63.0 UNSPECIFIED LUMP IN UNSPECIFIED BREAST: Chronic | Status: ACTIVE | Noted: 2024-01-30

## 2024-02-22 ENCOUNTER — APPOINTMENT (OUTPATIENT)
Dept: BREAST CENTER | Facility: CLINIC | Age: 54
End: 2024-02-22
Payer: MEDICAID

## 2024-02-22 VITALS
HEART RATE: 81 BPM | WEIGHT: 120 LBS | DIASTOLIC BLOOD PRESSURE: 62 MMHG | HEIGHT: 64 IN | BODY MASS INDEX: 20.49 KG/M2 | SYSTOLIC BLOOD PRESSURE: 108 MMHG

## 2024-02-22 DIAGNOSIS — N63.10 UNSPECIFIED LUMP IN THE RIGHT BREAST, UNSPECIFIED QUADRANT: ICD-10-CM

## 2024-02-22 DIAGNOSIS — R92.8 OTHER ABNORMAL AND INCONCLUSIVE FINDINGS ON DIAGNOSTIC IMAGING OF BREAST: ICD-10-CM

## 2024-02-22 DIAGNOSIS — D24.2 BENIGN NEOPLASM OF LEFT BREAST: ICD-10-CM

## 2024-02-22 PROCEDURE — 99024 POSTOP FOLLOW-UP VISIT: CPT

## 2024-02-23 PROBLEM — N63.10 BREAST MASS, RIGHT: Status: ACTIVE | Noted: 2023-09-11

## 2024-02-23 PROBLEM — R92.8 ABNORMAL FINDING ON BREAST IMAGING: Status: ACTIVE | Noted: 2023-08-30

## 2024-02-23 PROBLEM — D24.2 BREAST FIBROADENOMA IN FEMALE, LEFT: Status: ACTIVE | Noted: 2023-09-11

## 2024-02-23 NOTE — PHYSICAL EXAM
[Normocephalic] : normocephalic [EOMI] : extra ocular movement intact [de-identified] : Nl respirations [de-identified] : Incision site healing well with no signs of infection/dehiscence

## 2024-02-23 NOTE — HISTORY OF PRESENT ILLNESS
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  States she has a history of right breast excision at 36 that was benign.  FHx: Denies  Her most recent imaging is as follows: 6/25/2018: B scg mmg --> BIRADS 2 The breasts are extremely dense, which lowers the sensitivity of mammography. There is an area of benign architectural distortion corresponding to the site of surgery and consistent with post operative fibrosis seen in the right breast. No suspicious masses, calcifications or other abnormalities are seen. When compared to prior studies there is no significant change.  08/29/2023: B dx mammo and US-->BIRADS 4 -breasts are extremely dense -An oval partially obscured mass is noted in the left upper outer quadrant corresponds to the region of palpable concern.  -Postoperative changes are noted in the right lower inner quadrant.  LEFT US: - in the region of palpable concern, @1-2 N 4 to 5 a hypoechoic solid vascular mass measuring 2.3 x 1.2 x 2.1 cm. This is suspicious.  RIGHT US: -@1:00 4 cm from the nipple, there is an ovoid well-circumscribed hypoechoic mass measuring 0.8 x 0.7 x 0.4 cm--> This is probably benign.   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 Benign concordant histology. Recommendation: Surgical management. Please note that the patient has a probably benign lesion in the contralateral breast and follow-up ultrasound in 6 months is recommended.  1/31/24: R US --> BIRADS 3 In the right breast at 1:00 4 cm from the nipple, there is an ovoid well-circumscribed hypoechoic mass measuring 0.8 x 0.7 x 0.4 cm. This is stable and probably benign. Recommendation: Follow-up bilateral diagnostic mammogram and ultrasound in 6 months.  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 - Complex sclerosing papillary lesion containing florid duct hyperplasia.  Denies any current complaints. Healing well.

## 2024-02-23 NOTE — ASSESSMENT
[FreeTextEntry1] : Patient is a 53F with left breast symptomatic FA s/p excision on 2/7/24: myxoid FA with complex sclerosing lesion  She also has a right breast BIRADS 3 mass.  She underwent bilateral mmg/us in 8/2023 which showed right 1N4 0.8cm mass, probably benign with a US recommended in 6 months and left 1-2N4-5 2.3mass biopsied as myxoid FA with PASH (tophat) (benign, concordant).  She underwent a right US in 1/2024 which showed the stable right 1N4 8mm, probably benign mass (BIRADS 3).  She is s/p excision of left symptomatic FA with PASH on 2/7/24: myxoid FA with complex sclerosing lesion. We also discussed she is for BIRADS 3 following right mmg/us in 6 months.  We discussed her final pathology in detail. We discussed a FA and a sclerosing lesion. We discussed that there was no upgrade to malignancy.  All questions and concerns were answered in detail.  Patient is for bilateral mmg/us in 8/2024.  She is for follow up after imaging, pending any interval changes.  Total time spent on encounter was greater than 20 minutes, which included face to face time with the patient, performing an exam, reviewing previous medical records including imaging/ pathology, documenting in patient record and coordinating care/imaging. Greater than 50% of the encounter was spent on counseling and coordination of her breast issue.

## 2024-02-23 NOTE — PAST MEDICAL HISTORY
[Menarche Age ____] : age at menarche was [unfilled] [Total Preg ___] : G[unfilled] [Live Births ___] : P[unfilled]  [FreeTextEntry7] : Denies [FreeTextEntry6] : Denies [FreeTextEntry8] : None

## 2024-03-22 NOTE — ED PROVIDER NOTE - NSFOLLOWUPINSTRUCTIONS_ED_ALL_ED_FT
Follow up with [facility] for Mammogram ASAP.     DISCHARGE INSTRUCTIONS  - Please follow up with your OBGYN in 1-3 days  - Return to ED if you notice worsening vomiting, develop diarrhea, develop fevers, signs of respiratory distress, decreased oral intake, decreased wet diapers or any other concerning symptoms
No indicators present

## 2024-09-05 ENCOUNTER — APPOINTMENT (OUTPATIENT)
Dept: BREAST CENTER | Facility: CLINIC | Age: 54
End: 2024-09-05

## 2024-10-21 ENCOUNTER — OUTPATIENT (OUTPATIENT)
Dept: OUTPATIENT SERVICES | Facility: HOSPITAL | Age: 54
LOS: 1 days | End: 2024-10-21
Payer: MEDICAID

## 2024-10-21 ENCOUNTER — RESULT REVIEW (OUTPATIENT)
Age: 54
End: 2024-10-21

## 2024-10-21 DIAGNOSIS — R92.2 INCONCLUSIVE MAMMOGRAM: ICD-10-CM

## 2024-10-21 DIAGNOSIS — R92.8 OTHER ABNORMAL AND INCONCLUSIVE FINDINGS ON DIAGNOSTIC IMAGING OF BREAST: ICD-10-CM

## 2024-10-21 DIAGNOSIS — Z98.890 OTHER SPECIFIED POSTPROCEDURAL STATES: Chronic | ICD-10-CM

## 2024-10-21 DIAGNOSIS — Z12.31 ENCOUNTER FOR SCREENING MAMMOGRAM FOR MALIGNANT NEOPLASM OF BREAST: ICD-10-CM

## 2024-10-21 PROCEDURE — 76641 ULTRASOUND BREAST COMPLETE: CPT | Mod: 50

## 2024-10-21 PROCEDURE — G0279: CPT

## 2024-10-21 PROCEDURE — 76641 ULTRASOUND BREAST COMPLETE: CPT | Mod: 26,50

## 2024-10-21 PROCEDURE — 77066 DX MAMMO INCL CAD BI: CPT | Mod: 26

## 2024-10-21 PROCEDURE — 77062 BREAST TOMOSYNTHESIS BI: CPT | Mod: 26

## 2024-10-21 PROCEDURE — 77066 DX MAMMO INCL CAD BI: CPT

## 2024-10-22 DIAGNOSIS — R92.8 OTHER ABNORMAL AND INCONCLUSIVE FINDINGS ON DIAGNOSTIC IMAGING OF BREAST: ICD-10-CM

## 2025-07-16 ENCOUNTER — RESULT REVIEW (OUTPATIENT)
Age: 55
End: 2025-07-16

## 2025-07-16 ENCOUNTER — OUTPATIENT (OUTPATIENT)
Dept: OUTPATIENT SERVICES | Facility: HOSPITAL | Age: 55
LOS: 1 days | End: 2025-07-16
Payer: COMMERCIAL

## 2025-07-16 DIAGNOSIS — R92.8 OTHER ABNORMAL AND INCONCLUSIVE FINDINGS ON DIAGNOSTIC IMAGING OF BREAST: ICD-10-CM

## 2025-07-16 DIAGNOSIS — Z98.890 OTHER SPECIFIED POSTPROCEDURAL STATES: Chronic | ICD-10-CM

## 2025-07-16 PROCEDURE — 76642 ULTRASOUND BREAST LIMITED: CPT | Mod: RT

## 2025-07-16 PROCEDURE — 76642 ULTRASOUND BREAST LIMITED: CPT | Mod: 26,RT

## 2025-07-17 DIAGNOSIS — R92.8 OTHER ABNORMAL AND INCONCLUSIVE FINDINGS ON DIAGNOSTIC IMAGING OF BREAST: ICD-10-CM

## 2025-07-31 ENCOUNTER — APPOINTMENT (OUTPATIENT)
Dept: BREAST CENTER | Facility: CLINIC | Age: 55
End: 2025-07-31